# Patient Record
Sex: MALE | Race: WHITE | NOT HISPANIC OR LATINO | Employment: FULL TIME | URBAN - METROPOLITAN AREA
[De-identification: names, ages, dates, MRNs, and addresses within clinical notes are randomized per-mention and may not be internally consistent; named-entity substitution may affect disease eponyms.]

---
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
## 2023-04-16 ENCOUNTER — HEALTH MAINTENANCE LETTER (OUTPATIENT)
Age: 18
End: 2023-04-16

## 2023-07-05 ENCOUNTER — HOSPITAL ENCOUNTER (EMERGENCY)
Facility: CLINIC | Age: 18
Discharge: HOME | End: 2023-07-05
Attending: PHYSICIAN ASSISTANT | Admitting: PHYSICIAN ASSISTANT
Payer: COMMERCIAL

## 2023-07-05 VITALS
DIASTOLIC BLOOD PRESSURE: 67 MMHG | TEMPERATURE: 97.4 F | RESPIRATION RATE: 16 BRPM | OXYGEN SATURATION: 100 % | HEART RATE: 49 BPM | SYSTOLIC BLOOD PRESSURE: 120 MMHG

## 2023-07-05 DIAGNOSIS — H61.22 IMPACTED CERUMEN OF LEFT EAR: ICD-10-CM

## 2023-07-05 PROCEDURE — 69209 REMOVE IMPACTED EAR WAX UNI: CPT | Mod: LT | Performed by: PHYSICIAN ASSISTANT

## 2023-07-05 PROCEDURE — G0463 HOSPITAL OUTPT CLINIC VISIT: HCPCS | Performed by: PHYSICIAN ASSISTANT

## 2023-07-05 PROCEDURE — 99213 OFFICE O/P EST LOW 20 MIN: CPT | Performed by: PHYSICIAN ASSISTANT

## 2023-07-05 RX ORDER — OFLOXACIN 3 MG/ML
10 SOLUTION AURICULAR (OTIC) DAILY
Qty: 4 ML | Refills: 0 | Status: SHIPPED | OUTPATIENT
Start: 2023-07-05 | End: 2023-07-12

## 2023-07-05 ASSESSMENT — ACTIVITIES OF DAILY LIVING (ADL): ADLS_ACUITY_SCORE: 35

## 2023-07-05 NOTE — ED PROVIDER NOTES
History     Chief Complaint   Patient presents with     Ear Fullness     Clogged. Pain. Left ear.     HPI  Dioni Leon is a 18 year old male who presents today with left ear fullness and pressure for the past several days. Patient states symptoms worsened after using a Q-tip.  Patient states slight congestion but denies any other symptoms.  He denies any pain behind the ear, drainage from the ear or blood from the ear.    Allergies:  No Known Allergies    Problem List:    Patient Active Problem List    Diagnosis Date Noted     Ganglion cyst of volar aspect of left wrist 03/03/2022     Priority: Medium     Added automatically from request for surgery [id number]       Allergic rhinitis 01/26/2006     Priority: Medium     Problem list name updated by automated process. Provider to review          Past Medical History:    Past Medical History:   Diagnosis Date     Cough      Pneumonia, organism unspecified(486)      Routine infant or child health check      Symp invol head/neck NEC        Past Surgical History:    Past Surgical History:   Procedure Laterality Date     EXCISE GANGLION WRIST Left 4/15/2022    Procedure: Left wrist mass excision;  Surgeon: Bhaskar Hi MD;  Location: [facility] OR       Family History:    Family History   Problem Relation Age of Onset     Lipids Mother      Cancer Maternal Grandmother         ovarian     Lipids Maternal Grandmother      Allergies Father        Social History:  Marital Status:  Single [1]  Social History     Tobacco Use     Smoking status: Never     Smokeless tobacco: Never     Tobacco comments:     No exposure   Vaping Use     Vaping Use: Never used   Substance Use Topics     Alcohol use: Never     Drug use: Never        Medications:    ofloxacin (FLOXIN) 0.3 % otic solution  HYDROcodone-acetaminophen (NORCO) 5-325 MG tablet  ISOtretinoin (ABSORICA) 30 MG capsule  senna-docusate (SENOKOT-S/PERICOLACE) 8.6-50 MG tablet  triamcinolone (KENALOG) 0.025 % external  Patient called back states she still has the really bad cough and yellow phlegm. Also has noticed a metallic taste the last few days. Also requesting a letter from you so she can get her handicap placard renewed.  (Has this due to breathing) ointment          Review of Systems   HENT: Positive for ear pain. Negative for ear discharge.    All other systems reviewed and are negative.      Physical Exam   BP: 120/67  Pulse: (!) 49  Temp: 97.4  F (36.3  C)  Resp: 16  SpO2: 100 %      Physical Exam  Vitals and nursing note reviewed.   Constitutional:       General: He is not in acute distress.     Appearance: Normal appearance. He is normal weight. He is not ill-appearing or toxic-appearing.   HENT:      Right Ear: Tympanic membrane and ear canal normal.      Left Ear: There is impacted cerumen.      Ears:      Comments: Post earwax removal left ear canal slightly erythematous slightly swollen but no drainage or tenderness with palpation.  TM within normal limits.     Nose: Nose normal.      Mouth/Throat:      Mouth: Mucous membranes are moist.      Pharynx: Oropharynx is clear. No oropharyngeal exudate or posterior oropharyngeal erythema.   Eyes:      General: No scleral icterus.     Extraocular Movements: Extraocular movements intact.      Conjunctiva/sclera: Conjunctivae normal.      Pupils: Pupils are equal, round, and reactive to light.   Cardiovascular:      Rate and Rhythm: Normal rate and regular rhythm.      Heart sounds: Normal heart sounds.   Pulmonary:      Effort: Pulmonary effort is normal.      Breath sounds: Normal breath sounds.   Musculoskeletal:      Cervical back: Normal range of motion and neck supple.   Skin:     General: Skin is warm.      Capillary Refill: Capillary refill takes less than 2 seconds.      Findings: No bruising, erythema or rash.   Neurological:      General: No focal deficit present.      Mental Status: He is alert and oriented to person, place, and time.   Psychiatric:         Mood and Affect: Mood normal.         Behavior: Behavior normal.         Thought Content: Thought content normal.         Judgment: Judgment normal.         ED Course                 Procedures             Critical Care time:  none                No results found for this or any previous visit (from the past 24 hour(s)).    Medications - No data to display    Assessments & Plan (with Medical Decision Making)     I have reviewed the nursing notes.    I have reviewed the findings, diagnosis, plan and need for follow up with the patient.    Dioni Leon is a 18 year old male who presents today with left ear fullness and pressure for the past several days. Patient states symptoms worsened after using a Q-tip.  Patient states slight congestion but denies any other symptoms.  He denies any pain behind the ear, drainage from the ear or blood from the ear.    Exam findings consistent with left cerumen impaction.  Earwax removed without complication.  There was slight erythema and swelling to the ear canal however no tenderness.  Wait-and-see prescription for Floxin otic eardrops sent with patient to fill if signs and symptoms of otitis externa occur.  These were discussed and given in discharge paperwork.  Patient discharged in stable condition with no concerns from mastoiditis, otitis externa or otitis media at this time.    Discharge Medication List as of 7/5/2023  3:26 PM      START taking these medications    Details   ofloxacin (FLOXIN) 0.3 % otic solution Place 10 drops Into the left ear daily for 7 days, Disp-4 mL, R-0, Local Print             Final diagnoses:   Impacted cerumen of left ear       7/5/2023   [facility] EMERGENCY DEPT     Olive Lamb PA-C  07/05/23 1520

## 2023-07-05 NOTE — DISCHARGE INSTRUCTIONS
Prescription for antibiotic eardrops given as a wait-and-see to fill if ear pain occurs, drainage or swelling of the ear canal occurs.  At this time you do not need to start the antibiotic and I suspect that the redness is related to the recent removal of cerumen impaction however if symptoms occur you can get this prescription filled and use as directed.

## 2023-08-01 ENCOUNTER — ANCILLARY PROCEDURE (OUTPATIENT)
Dept: GENERAL RADIOLOGY | Facility: CLINIC | Age: 18
End: 2023-08-01
Attending: PODIATRIST
Payer: COMMERCIAL

## 2023-08-01 ENCOUNTER — OFFICE VISIT (OUTPATIENT)
Dept: PODIATRY | Facility: CLINIC | Age: 18
End: 2023-08-01
Payer: COMMERCIAL

## 2023-08-01 VITALS
HEART RATE: 73 BPM | BODY MASS INDEX: 26.47 KG/M2 | SYSTOLIC BLOOD PRESSURE: 105 MMHG | HEIGHT: 67 IN | DIASTOLIC BLOOD PRESSURE: 70 MMHG

## 2023-08-01 DIAGNOSIS — M21.6X2 PRONATION OF BOTH FEET: ICD-10-CM

## 2023-08-01 DIAGNOSIS — M21.619 BUNION: ICD-10-CM

## 2023-08-01 DIAGNOSIS — M21.6X1 PRONATION OF BOTH FEET: ICD-10-CM

## 2023-08-01 DIAGNOSIS — M21.619 BUNION: Primary | ICD-10-CM

## 2023-08-01 PROCEDURE — 73630 X-RAY EXAM OF FOOT: CPT | Mod: TC | Performed by: RADIOLOGY

## 2023-08-01 PROCEDURE — 99203 OFFICE O/P NEW LOW 30 MIN: CPT | Performed by: PODIATRIST

## 2023-08-01 NOTE — LETTER
8/1/2023         RE: Dioni Leon  [address]  [facility] [address]        Dear Colleague,    Thank you for referring your patient, Dioni Leon, to the [facility]. Please see a copy of my visit note below.     Subjective:    Pt is seen today as a new pt referral with the c/c of painful left foot.  This has been symptomatic for at least 1 year.  Points to medial head of first metatarsal.  Has pain w/ ambulation and shoewear and is relieved by rest and going barefoot.  Denies pain in the contralateral foot.  He is on his feet at work.  Denies masses.  Denies numbness.  Denies erythema or ecchymosis.  Is not interested in surgery today but wondering about recovery from surgery and what this would involve.     ROS:   See above     No Known Allergies    Current Outpatient Medications   Medication Sig Dispense Refill     HYDROcodone-acetaminophen (NORCO) 5-325 MG tablet Take 1-2 tablets by mouth every 6 hours as needed for moderate to severe pain (Patient not taking: Reported on 8/1/2023) 8 tablet 0       Patient Active Problem List   Diagnosis     Allergic rhinitis     Ganglion cyst of volar aspect of left wrist       Past Medical History:   Diagnosis Date     Cough      Pneumonia, organism unspecified(486)      Routine infant or child health check      Symp invol head/neck NEC     Plagiocephaly       Past Surgical History:   Procedure Laterality Date     EXCISE GANGLION WRIST Left 4/15/2022    Procedure: Left wrist mass excision;  Surgeon: Bhaskar Hi MD;  Location: [facility] OR       Family History   Problem Relation Age of Onset     Lipids Mother      Cancer Maternal Grandmother         ovarian     Lipids Maternal Grandmother      Allergies Father        Social History     Tobacco Use     Smoking status: Never     Smokeless tobacco: Never     Tobacco comments:     No exposure   Substance Use Topics     Alcohol use: Never       Objective:    O:  /70   Pulse 73  "  Ht 1.702 m (5' 7\")   BMI 26.47 kg/m  .      Constitutional/ general:  Pt is in no apparent distress, appears well-nourished.  Cooperative with history and physical exam.     Psych:  The patient answered questions appropriately.  Normal affect.  Seems to have reasonable expectations, in terms of treatment.     Lungs:  Non labored breathing, non labored speech. No cough.  No audible wheezing. Even, quiet breathing.       Vascular:  Pedal pulses are palpable bilaterally for both the DP and PT arteries.  CFT < 3 sec.  No edema.  Pedal hair growth noted.     Neuro:  Alert and oriented x 3. Coordinated gait.  Light touch sensation is intact     Derm: Normal texture and turgor.  No erythema, ecchymosis, or cyanosis.  No open lesions.     Musculoskeletal:    Lower extremity muscle strength is normal.  Patient is ambulatory without an assistive device or brace.    Pronated arch with weightbearing.   No equinus.    left bunion deformity noted.  No pain with range of motion.  Positive tracking with ROM.  No medial bursa or masses noted.  No pain on the sesamoids or dorsally.        Radiographic Exam:   X-ray three views foot shows IM angle increased with metatarsus adductus which is compensated by his pronation    Assessment:  Hallux abducto valgus deformity left                        Metatarsus adductus compensated with pronation    Plan:  Xray taken of foot.  Explained to patient that a bunion is caused by a muscle imbalance. The big toe is pulled toward the smaller toes. The lump is created by a bone pushing outward.  Also discussed with patient his compensated metatarsus adductus and how this contributes to bunion deformity.  We wrote prescription for orthotics today and he will wear these in a good shoe.  We will do range of motion of joint.  Discussed surgery.  He would need Lapidus fusion.  Discussed recovery.  Discussed risk complications and efficacy.  Discussed hearted to totally reduce with metatarsus adductus. "  RTC as needed.    Geoffrey Russo DPM, FACFAS      Again, thank you for allowing me to participate in the care of your patient.        Sincerely,        Geoffrey Russo DPM

## 2023-08-01 NOTE — PATIENT INSTRUCTIONS
We wish you continued good healing. If you have any questions or concerns, please do not hesitate to contact us at  [phone]    [employer] (secure e-mail communication and access to your chart) to send a message or to make an appointment.    Please remember to call and schedule a follow up appointment if one was recommended at your earliest convenience.     PODIATRY CLINIC HOURS  TELEPHONE NUMBER    Dr. Geoffrey MARIANOPHODA [facility]        Clinics:  [place]  [facility], [place], [facility]  Tuesday 1PM-6PM  [facility]  Wednesday 745AM-330PM  [place]  Thursday/Friday 745AM-230PM  [place]   1st and 3rd Mondays  845-430 PM   [place] APPOINT[facility]  [phone]    [place] APPOINTMENTS  [phone]    [place] APPOINTMENTS  [phone]        If you need a medication refill, please contact us you may need lab work and/or a follow up visit prior to your refill (i.e. Antifungal medications).  If MRI needed please call Imaging at [phone]   HOW DO I GET MY KNEE SCOOTER? Knee scooters can be picked up at ANY Medical Supply stores with your knee scooter Prescription.  OR  Bring your signed prescription to an [facility] showroom.  Call or bring in your Orthotics order to any Orthotics locations. Or call [phone]

## 2023-08-02 NOTE — PROGRESS NOTES
" Subjective:    Pt is seen today as a new pt referral with the c/c of painful left foot.  This has been symptomatic for at least 1 year.  Points to medial head of first metatarsal.  Has pain w/ ambulation and shoewear and is relieved by rest and going barefoot.  Denies pain in the contralateral foot.  He is on his feet at work.  Denies masses.  Denies numbness.  Denies erythema or ecchymosis.  Is not interested in surgery today but wondering about recovery from surgery and what this would involve.     ROS:   See above     No Known Allergies    Current Outpatient Medications   Medication Sig Dispense Refill    HYDROcodone-acetaminophen (NORCO) 5-325 MG tablet Take 1-2 tablets by mouth every 6 hours as needed for moderate to severe pain (Patient not taking: Reported on 8/1/2023) 8 tablet 0       Patient Active Problem List   Diagnosis    Allergic rhinitis    Ganglion cyst of volar aspect of left wrist       Past Medical History:   Diagnosis Date    Cough     Pneumonia, organism unspecified(486)     Routine infant or child health check     Symp invol head/neck NEC     Plagiocephaly       Past Surgical History:   Procedure Laterality Date    EXCISE GANGLION WRIST Left 4/15/2022    Procedure: Left wrist mass excision;  Surgeon: Bhaskar Hi MD;  Location: [facility] OR       Family History   Problem Relation Age of Onset    Lipids Mother     Cancer Maternal Grandmother         ovarian    Lipids Maternal Grandmother     Allergies Father        Social History     Tobacco Use    Smoking status: Never    Smokeless tobacco: Never    Tobacco comments:     No exposure   Substance Use Topics    Alcohol use: Never       Objective:    O:  /70   Pulse 73   Ht 1.702 m (5' 7\")   BMI 26.47 kg/m  .      Constitutional/ general:  Pt is in no apparent distress, appears well-nourished.  Cooperative with history and physical exam.     Psych:  The patient answered questions appropriately.  Normal affect.  Seems to have reasonable " expectations, in terms of treatment.     Lungs:  Non labored breathing, non labored speech. No cough.  No audible wheezing. Even, quiet breathing.       Vascular:  Pedal pulses are palpable bilaterally for both the DP and PT arteries.  CFT < 3 sec.  No edema.  Pedal hair growth noted.     Neuro:  Alert and oriented x 3. Coordinated gait.  Light touch sensation is intact     Derm: Normal texture and turgor.  No erythema, ecchymosis, or cyanosis.  No open lesions.     Musculoskeletal:    Lower extremity muscle strength is normal.  Patient is ambulatory without an assistive device or brace.    Pronated arch with weightbearing.   No equinus.    left bunion deformity noted.  No pain with range of motion.  Positive tracking with ROM.  No medial bursa or masses noted.  No pain on the sesamoids or dorsally.        Radiographic Exam:   X-ray three views foot shows IM angle increased with metatarsus adductus which is compensated by his pronation    Assessment:  Hallux abducto valgus deformity left                        Metatarsus adductus compensated with pronation    Plan:  Xray taken of foot.  Explained to patient that a bunion is caused by a muscle imbalance. The big toe is pulled toward the smaller toes. The lump is created by a bone pushing outward.  Also discussed with patient his compensated metatarsus adductus and how this contributes to bunion deformity.  We wrote prescription for orthotics today and he will wear these in a good shoe.  We will do range of motion of joint.  Discussed surgery.  He would need Lapidus fusion.  Discussed recovery.  Discussed risk complications and efficacy.  Discussed hearted to totally reduce with metatarsus adductus.  RTC as needed.    Geoffrey Russo, GUANAKITO, FACFAS

## 2024-06-06 ENCOUNTER — APPOINTMENT (OUTPATIENT)
Dept: GENERAL RADIOLOGY | Facility: CLINIC | Age: 19
End: 2024-06-06
Attending: NURSE PRACTITIONER
Payer: OTHER MISCELLANEOUS

## 2024-06-06 ENCOUNTER — HOSPITAL ENCOUNTER (EMERGENCY)
Facility: CLINIC | Age: 19
Discharge: HOME | End: 2024-06-06
Attending: NURSE PRACTITIONER | Admitting: NURSE PRACTITIONER
Payer: OTHER MISCELLANEOUS

## 2024-06-06 VITALS
TEMPERATURE: 97.6 F | RESPIRATION RATE: 18 BRPM | OXYGEN SATURATION: 99 % | DIASTOLIC BLOOD PRESSURE: 71 MMHG | HEART RATE: 56 BPM | SYSTOLIC BLOOD PRESSURE: 123 MMHG

## 2024-06-06 DIAGNOSIS — S80.02XA CONTUSION OF LEFT KNEE, INITIAL ENCOUNTER: ICD-10-CM

## 2024-06-06 DIAGNOSIS — S96.912A MUSCLE STRAIN OF LEFT FOOT, INITIAL ENCOUNTER: ICD-10-CM

## 2024-06-06 PROCEDURE — 99213 OFFICE O/P EST LOW 20 MIN: CPT | Performed by: NURSE PRACTITIONER

## 2024-06-06 PROCEDURE — G0463 HOSPITAL OUTPT CLINIC VISIT: HCPCS | Performed by: NURSE PRACTITIONER

## 2024-06-06 PROCEDURE — 73562 X-RAY EXAM OF KNEE 3: CPT | Mod: LT

## 2024-06-06 PROCEDURE — 73630 X-RAY EXAM OF FOOT: CPT | Mod: LT

## 2024-06-06 ASSESSMENT — COLUMBIA-SUICIDE SEVERITY RATING SCALE - C-SSRS
1. IN THE PAST MONTH, HAVE YOU WISHED YOU WERE DEAD OR WISHED YOU COULD GO TO SLEEP AND NOT WAKE UP?: NO
2. HAVE YOU ACTUALLY HAD ANY THOUGHTS OF KILLING YOURSELF IN THE PAST MONTH?: NO
6. HAVE YOU EVER DONE ANYTHING, STARTED TO DO ANYTHING, OR PREPARED TO DO ANYTHING TO END YOUR LIFE?: NO

## 2024-06-06 ASSESSMENT — ACTIVITIES OF DAILY LIVING (ADL): ADLS_ACUITY_SCORE: 35

## 2024-06-06 NOTE — ED PROVIDER NOTES
ED Provider Note  [facility]      History     Chief Complaint   Patient presents with    Knee Injury    Foot Pain     HPI   Dioni Leon is a 19 year old male who presents after work injury 3 days ago.  Injury to his left knee after a bobcat attachment flipped up into the area he was standing and came down onto him, hitting his knee.  Patient has pain just above his heel on his left foot unsure if this is related to knee injury.  Tolerates standing and walking on his foot and knee.  Denies any loss of sensation in knee leg or foot.  He reports that his employer will not obtain to be seen to make sure he does not have injuries that would cause problems for him in the future.          Allergies:  No Known Allergies    Problem List:    Patient Active Problem List    Diagnosis Date Noted    Ganglion cyst of volar aspect of left wrist 03/03/2022     Priority: Medium     Added automatically from request for surgery [id number]      Allergic rhinitis 01/26/2006     Priority: Medium     Problem list name updated by automated process. Provider to review          Past Medical History:    Past Medical History:   Diagnosis Date    Cough     Pneumonia, organism unspecified(486)     Routine infant or child health check     Symp invol head/neck NEC        Past Surgical History:    Past Surgical History:   Procedure Laterality Date    EXCISE GANGLION WRIST Left 4/15/2022    Procedure: Left wrist mass excision;  Surgeon: Bhaskar Hi MD;  Location: [facility] OR       Family History:    Family History   Problem Relation Age of Onset    Lipids Mother     Cancer Maternal Grandmother         ovarian    Lipids Maternal Grandmother     Allergies Father        Social History:  Marital Status:  Single [1]  Social History     Tobacco Use    Smoking status: Never    Smokeless tobacco: Never    Tobacco comments:     No exposure   Vaping Use    Vaping status: Never Used   Substance Use Topics    Alcohol use: Never     Drug use: Never        Medications:    HYDROcodone-acetaminophen (NORCO) 5-325 MG tablet          Review of Systems  A medically appropriate review of systems was performed with pertinent positives and negatives noted in the HPI, and all other systems negative.    Physical Exam   Patient Vitals for the past 24 hrs:   BP Temp Temp src Pulse Resp SpO2   06/06/24 1511 123/71 97.6  F (36.4  C) Tympanic 56 18 99 %          Physical Exam  Musculoskeletal:      Right knee: Normal.      Left knee: Swelling and ecchymosis present. No deformity, effusion, erythema, lacerations, bony tenderness or crepitus. Normal range of motion. Tenderness present over the medial joint line. No lateral joint line, MCL, LCL, ACL, PCL or patellar tendon tenderness. No LCL laxity, MCL laxity, ACL laxity or PCL laxity.Normal alignment, normal meniscus and normal patellar mobility.      Instability Tests: Anterior drawer test negative. Posterior drawer test negative. Anterior Lachman test negative. Medial Tay test negative and lateral Tay test negative.      Right lower leg: Normal. No swelling, deformity, lacerations, tenderness or bony tenderness. No edema.      Left lower leg: No swelling, deformity, lacerations, tenderness or bony tenderness. No edema.      Right foot: Normal.      Left foot: Normal range of motion and normal capillary refill. Tenderness present. No swelling, deformity, bunion, Charcot foot, foot drop, prominent metatarsal heads, laceration, bony tenderness or crepitus. Normal pulse.        Legs:    General: alert and in no acute distress on arrival  Head: atraumatic, normocephalic  Lungs:  nonlabored  CV:  extremities warm and perfused  Skin: no rashes, no diaphoresis and skin color normal  Neuro: Patient awake, alert, speech is fluent, focal deficits  Psychiatric: affect/mood normal, appropriate historian.      ED Course     pdated   Order    06/06/24 1620  XR Knee Left 3 Views  Performed: 06/06/24 1609  Final          Impression: IMPRESSION: No acute fracture or malalignment. There is normal joint spacing. No knee joint effusion. GEOFFREY SUAREZ MD SYSTEM ID: [id number]      06/06/24 1619  Foot XR, G/E 3 views, left  Performed: 06/06/24 1613  Final         Impression: IMPRESSION: No acute fracture or malalignment. There is normal joint spacing. Moderate hallux valgus deformity. GEOFFREY SUAREZ MD SYSTEM ID: [id number]                    Procedures                    Results for orders placed or performed during the hospital encounter of 06/06/24 (from the past 24 hour(s))   XR Knee Left 3 Views    Narrative    KNEE THREE VIEWS LEFT  6/6/2024 4:09 PM     HISTORY: Work injury 3 days ago pain in left knee  COMPARISON: None.      Impression    IMPRESSION: No acute fracture or malalignment. There is normal joint  spacing. No knee joint effusion.    GEOFFREY SUAREZ MD         SYSTEM ID:  [id number]   Foot XR, G/E 3 views, left    Narrative    FOOT THREE VIEWS LEFT  6/6/2024 4:13 PM     HISTORY: Pain with range of motion of the left foot.  COMPARISON: 8/1/2023      Impression    IMPRESSION: No acute fracture or malalignment. There is normal joint  spacing. Moderate hallux valgus deformity.    GEOFFREY SUAREZ MD         SYSTEM ID:  [id number]       MEDICATIONS GIVEN IN THE EMERGENCY DEPARTMENT:  Medications - No data to display             Assessments & Plan (with Medical Decision Making)  19 year old male who presents to the Urgent Care for evaluation of knee injury x-ray negative for fractures or bony abnormalities.  Diagnosis contusion of the left knee, initial encounter.  Left foot mild pain with ambulation diagnosis muscle strain of left foot x-rays are normal without any fractures or bone abnormalities identified.  Recommended icing, compression with Ace bandage if pain worsens staying mobile to prevent stiffness.     I have reviewed the nursing notes.    I have reviewed the findings, diagnosis, plan and need for  follow up with the patient.        NEW PRESCRIPTIONS STARTED AT TODAY'S ER VISIT  Discharge Medication List as of 6/6/2024  4:26 PM          Final diagnoses:   Contusion of left knee, initial encounter   Muscle strain of left foot, initial encounter       6/6/2024   [facility] EMERGENCY DEPT       Beulah Hale, KERA CNP  06/19/24 1600

## 2024-06-06 NOTE — Clinical Note
Dioni Leon was seen and treated in our emergency department on 6/6/2024.  He may return to work on 06/07/2024.       If you have any questions or concerns, please don't hesitate to call.      Beulah Hale, KERA CNP

## 2024-06-06 NOTE — DISCHARGE INSTRUCTIONS
Recommend use of ibuprofen as needed for pain gentle stretching to reduce stiffness.  Heat can be helpful to reabsorb bruising under the skin.  If symptoms or not improving despite recommended treatment plan please return.  No fractures or abnormalities identified on x-rays of knee or foot.

## 2024-06-06 NOTE — ED TRIAGE NOTES
"Patient had something fall on left knee at work on Monday. Pain and swelling to knee and \"tingling and popping\" feeling in left foot. Pt is able to ambulate without any assistance without severe pain.         "

## 2024-06-22 ENCOUNTER — HEALTH MAINTENANCE LETTER (OUTPATIENT)
Age: 19
End: 2024-06-22

## 2025-07-12 ENCOUNTER — HEALTH MAINTENANCE LETTER (OUTPATIENT)
Age: 20
End: 2025-07-12

## (undated) DEVICE — DRSG GAUZE 4X4" TOPPER

## (undated) DEVICE — SOL WATER IRRIG 1000ML BOTTLE 07139-09

## (undated) DEVICE — GLOVE PROTEXIS BLUE W/NEU-THERA 7.5  2D73EB75

## (undated) DEVICE — GLOVE PROTEXIS W/NEU-THERA 7.5  2D73TE75

## (undated) DEVICE — PACK HAND WRIST SOP15HWFSP

## (undated) DEVICE — DRAPE STERI TOWEL SM 1000

## (undated) DEVICE — DRSG ADAPTIC 3X8" 6113

## (undated) DEVICE — SU PROLENE 4-0 PS-2 18" 8682G

## (undated) DEVICE — DRSG STERI STRIP 1/2X4" R1547

## (undated) DEVICE — GLOVE PROTEXIS POWDER FREE 8.0 ORTHOPEDIC 2D73ET80

## (undated) DEVICE — NDL 19GA 1.5"

## (undated) DEVICE — SU MONOCRYL 3-0 SH 27" Y316H

## (undated) DEVICE — BNDG ELASTIC 2"X5YDS UNSTERILE 6611-20

## (undated) DEVICE — PREP CHLORAPREP 26ML TINTED ORANGE  260815

## (undated) RX ORDER — PROPOFOL 10 MG/ML
INJECTION, EMULSION INTRAVENOUS
Status: DISPENSED
Start: 2022-04-15

## (undated) RX ORDER — ONDANSETRON 2 MG/ML
INJECTION INTRAMUSCULAR; INTRAVENOUS
Status: DISPENSED
Start: 2022-04-15

## (undated) RX ORDER — KETOROLAC TROMETHAMINE 30 MG/ML
INJECTION, SOLUTION INTRAMUSCULAR; INTRAVENOUS
Status: DISPENSED
Start: 2022-04-15

## (undated) RX ORDER — FENTANYL CITRATE 50 UG/ML
INJECTION, SOLUTION INTRAMUSCULAR; INTRAVENOUS
Status: DISPENSED
Start: 2022-04-15

## (undated) RX ORDER — DEXAMETHASONE SODIUM PHOSPHATE 4 MG/ML
INJECTION, SOLUTION INTRA-ARTICULAR; INTRALESIONAL; INTRAMUSCULAR; INTRAVENOUS; SOFT TISSUE
Status: DISPENSED
Start: 2022-04-15

## (undated) RX ORDER — OXYCODONE HYDROCHLORIDE 5 MG/1
TABLET ORAL
Status: DISPENSED
Start: 2022-04-15

## (undated) RX ORDER — ACETAMINOPHEN 325 MG/1
TABLET ORAL
Status: DISPENSED
Start: 2022-04-15

## (undated) RX ORDER — CEFAZOLIN SODIUM 1 G/3ML
INJECTION, POWDER, FOR SOLUTION INTRAMUSCULAR; INTRAVENOUS
Status: DISPENSED
Start: 2022-04-15